# Patient Record
Sex: MALE | Race: BLACK OR AFRICAN AMERICAN | NOT HISPANIC OR LATINO | URBAN - METROPOLITAN AREA
[De-identification: names, ages, dates, MRNs, and addresses within clinical notes are randomized per-mention and may not be internally consistent; named-entity substitution may affect disease eponyms.]

---

## 2022-06-14 ENCOUNTER — OFFICE VISIT (OUTPATIENT)
Dept: FAMILY MEDICINE | Facility: CLINIC | Age: 27
End: 2022-06-14
Payer: COMMERCIAL

## 2022-06-14 VITALS
OXYGEN SATURATION: 99 % | HEIGHT: 71 IN | BODY MASS INDEX: 20.86 KG/M2 | HEART RATE: 55 BPM | DIASTOLIC BLOOD PRESSURE: 60 MMHG | WEIGHT: 149 LBS | RESPIRATION RATE: 22 BRPM | TEMPERATURE: 97.8 F | SYSTOLIC BLOOD PRESSURE: 103 MMHG

## 2022-06-14 DIAGNOSIS — H10.13 ALLERGIC CONJUNCTIVITIS, BILATERAL: Primary | ICD-10-CM

## 2022-06-14 PROCEDURE — 99203 OFFICE O/P NEW LOW 30 MIN: CPT | Performed by: FAMILY MEDICINE

## 2022-06-14 RX ORDER — OLOPATADINE HYDROCHLORIDE 1 MG/ML
1 SOLUTION/ DROPS OPHTHALMIC 2 TIMES DAILY
Qty: 5 ML | Refills: 3 | Status: SHIPPED | OUTPATIENT
Start: 2022-06-14

## 2022-06-14 NOTE — PROGRESS NOTES
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      ROS:  General: No fevers, chills  Head: No headache  Ears: No acute change in hearing.    CV: No chest pain or palpitations.  Resp: No shortness of breath.  No cough. No hemoptysis.  GI: No nausea, vomiting, constipation, diarrhea  : No urinary pains    O: /60 (BP Location: Right arm, Patient Position: Sitting, Cuff Size: Adult Large)   Pulse 55   Temp 97.8  F (36.6  C) (Tympanic)   Resp 22   Ht 1.803 m (5' 11\")   Wt 67.6 kg (149 lb)   SpO2 99%   BMI 20.78 kg/m     Gen:  Well nourished and in NAD  HEENT: PERRLA; TMs normal color and landmarks; nasopharynx pink and moist; oropharynx pink and moist    sclera with mild redness   Neck: supple without lymphadenopathy  CV:  RRR  - no murmurs, rubs, or gallups,   Pulm:  CTAB, no wheezes/rales/rhonchi, good air entry   ABD: soft, nontender, no masses, no rebound, BS intact throughout  Extrem: no cyanosis, edema or clubbing  Psych: Euthymic      (H10.13) Allergic conjunctivitis, bilateral  (primary encounter diagnosis)  Comment:   Plan: olopatadine (PATANOL) 0.1 % ophthalmic solution        Eye protection   eye lublucation      RTC in  4 to 6 weeks  weeks, for follow up of eye irritqitonor sooner if develops new or worsening symptoms.    Steve Monteiro MD      "